# Patient Record
Sex: FEMALE | Race: BLACK OR AFRICAN AMERICAN | NOT HISPANIC OR LATINO | URBAN - METROPOLITAN AREA
[De-identification: names, ages, dates, MRNs, and addresses within clinical notes are randomized per-mention and may not be internally consistent; named-entity substitution may affect disease eponyms.]

---

## 2020-02-12 ENCOUNTER — EMERGENCY (EMERGENCY)
Facility: HOSPITAL | Age: 50
LOS: 1 days | Discharge: HOME | End: 2020-02-12
Attending: EMERGENCY MEDICINE | Admitting: EMERGENCY MEDICINE
Payer: MEDICAID

## 2020-02-12 VITALS
TEMPERATURE: 98 F | HEART RATE: 93 BPM | SYSTOLIC BLOOD PRESSURE: 158 MMHG | WEIGHT: 229.94 LBS | OXYGEN SATURATION: 95 % | DIASTOLIC BLOOD PRESSURE: 118 MMHG | RESPIRATION RATE: 16 BRPM

## 2020-02-12 VITALS
SYSTOLIC BLOOD PRESSURE: 151 MMHG | TEMPERATURE: 98 F | OXYGEN SATURATION: 99 % | HEART RATE: 61 BPM | RESPIRATION RATE: 16 BRPM | DIASTOLIC BLOOD PRESSURE: 87 MMHG
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
## 2020-02-12 DIAGNOSIS — S16.1XXA STRAIN OF MUSCLE, FASCIA AND TENDON AT NECK LEVEL, INITIAL ENCOUNTER: ICD-10-CM

## 2020-02-12 DIAGNOSIS — M54.2 CERVICALGIA: ICD-10-CM
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
## 2020-02-12 DIAGNOSIS — M54.9 DORSALGIA, UNSPECIFIED: ICD-10-CM
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
## 2020-02-12 PROCEDURE — 99284 EMERGENCY DEPT VISIT MOD MDM: CPT

## 2020-02-12 PROCEDURE — 72125 CT NECK SPINE W/O DYE: CPT | Mod: 26

## 2020-02-12 RX ORDER — IBUPROFEN 200 MG
600 TABLET ORAL ONCE
Refills: 0 | Status: COMPLETED | OUTPATIENT
Start: 2020-02-12 | End: 2020-02-12

## 2020-02-12 RX ORDER — DIAZEPAM 5 MG
10 TABLET ORAL ONCE
Refills: 0 | Status: DISCONTINUED | OUTPATIENT
Start: 2020-02-12 | End: 2020-02-12

## 2020-02-12 RX ORDER — IBUPROFEN 200 MG
1 TABLET ORAL
Qty: 30 | Refills: 0
Start: 2020-02-12

## 2020-02-12 RX ORDER — ACETAMINOPHEN 500 MG
650 TABLET ORAL ONCE
Refills: 0 | Status: COMPLETED | OUTPATIENT
Start: 2020-02-12 | End: 2020-02-12

## 2020-02-12 RX ORDER — DIAZEPAM 5 MG
1 TABLET ORAL
Qty: 5 | Refills: 0
Start: 2020-02-12 | End: 2020-02-16

## 2020-02-12 RX ORDER — ACETAMINOPHEN 500 MG
2 TABLET ORAL
Qty: 60 | Refills: 0
Start: 2020-02-12

## 2020-02-12 RX ORDER — METHOCARBAMOL 500 MG/1
2 TABLET, FILM COATED ORAL
Qty: 28 | Refills: 0
Start: 2020-02-12 | End: 2020-02-16

## 2020-02-12 RX ADMIN — Medication 650 MILLIGRAM(S): at 14:46

## 2020-02-12 RX ADMIN — Medication 10 MILLIGRAM(S): at 16:00

## 2020-02-12 RX ADMIN — Medication 600 MILLIGRAM(S): at 14:46
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
## 2020-02-12 NOTE — ED PROVIDER NOTE - CONSTITUTIONAL, MLM
normal... Appears uncomfortable. Awake, alert, oriented to person, place, time/situation and in no apparent distress.

## 2020-02-12 NOTE — ED ADULT NURSE NOTE - CHPI ED NUR SYMPTOMS NEG
no fever/no chills/no decreased eating/drinking/no nausea/no pain/no weakness/no dizziness/no tingling/no vomiting

## 2020-02-12 NOTE — ED PROVIDER NOTE - NSFOLLOWUPINSTRUCTIONS_ED_ALL_ED_FT
You seem to have a neck/cervical strain.  '  Please read the attached instructions.    Return for increased pain or any worriesome symptoms.    Please followup with one of our Spine MD as needed or your PMD.    Consider follow up with Dr. Felton or one of the other MDs or DO's at [facility].     OTC Motrin/Advil (ibuprofen) 600mg every 6h and/or Tylenol (acetaminophen) 1000mg every 6h for pain.   Return for worse pain, new worrisome symptoms or other medical emergencies.

## 2020-02-12 NOTE — ED PROVIDER NOTE - CARE PROVIDER_API CALL
Peg Jameson)  Neurological Surgery  [address] [place] [address], [address]  [place] [address]  Phone: [phone]  Fax: [phone]  Follow Up Time:     Laron Casanova)  Surgical Physicians  [address] [place] [address], [address]  [place] [address]  Phone: [phone]  Fax: [phone]  Follow Up Time:
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
## 2020-02-12 NOTE — ED PROVIDER NOTE - CARE PROVIDERS DIRECT ADDRESSES
[email or web address]

## 2020-02-12 NOTE — ED PROVIDER NOTE - MUSCULOSKELETAL, MLM
Diffuse pain throughout cervical spine, both midline and paraspinal. Trapezius are very tight bilaterally. No other spine tenderness.
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
## 2020-02-12 NOTE — ED PROVIDER NOTE - PATIENT PORTAL LINK FT
You can access the FollowMyHealth Patient Portal offered by [facility] by registering at the following website: [email or web address]. By joining [employer]’s FollowMyHealth portal, you will also be able to view your health information using other applications (apps) compatible with our system.
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
